# Patient Record
Sex: FEMALE | Race: WHITE
[De-identification: names, ages, dates, MRNs, and addresses within clinical notes are randomized per-mention and may not be internally consistent; named-entity substitution may affect disease eponyms.]

---

## 2017-06-26 ENCOUNTER — HOSPITAL ENCOUNTER (OUTPATIENT)
Dept: HOSPITAL 17 - CLAB | Age: 62
End: 2017-06-26
Attending: FAMILY MEDICINE
Payer: COMMERCIAL

## 2017-06-26 DIAGNOSIS — E78.5: Primary | ICD-10-CM

## 2017-06-26 DIAGNOSIS — R73.03: ICD-10-CM

## 2017-06-26 LAB
ALP SERPL-CCNC: 40 U/L (ref 45–117)
ALT SERPL-CCNC: 37 U/L (ref 10–53)
ANION GAP SERPL CALC-SCNC: 7 MEQ/L (ref 5–15)
AST SERPL-CCNC: 37 U/L (ref 15–37)
BASOPHILS # BLD AUTO: 0 TH/MM3 (ref 0–0.2)
BASOPHILS NFR BLD: 0.4 % (ref 0–2)
BILIRUB SERPL-MCNC: 0.4 MG/DL (ref 0.2–1)
BUN SERPL-MCNC: 28 MG/DL (ref 7–18)
CHLORIDE SERPL-SCNC: 102 MEQ/L (ref 98–107)
EOSINOPHIL # BLD: 0.2 TH/MM3 (ref 0–0.4)
EOSINOPHIL NFR BLD: 3.1 % (ref 0–4)
ERYTHROCYTE [DISTWIDTH] IN BLOOD BY AUTOMATED COUNT: 13.3 % (ref 11.6–17.2)
GFR SERPLBLD BASED ON 1.73 SQ M-ARVRAT: 58 ML/MIN (ref 89–?)
HCO3 BLD-SCNC: 28.1 MEQ/L (ref 21–32)
HCT VFR BLD CALC: 41.6 % (ref 35–46)
HDLC SERPL-MCNC: 70.2 MG/DL (ref 40–60)
HEMO FLAGS: (no result)
HEMOGLOBIN A1A: 1 %
HEMOGLOBIN A1B: 1.6 %
HEMOGLOBIN AO: 84.9 %
HEMOGLOBIN LA1C: 2.2 %
HEMOGLOBIN P3: 4 %
LDLC SERPL-MCNC: 111 MG/DL (ref 0–99)
LYMPHOCYTES # BLD AUTO: 1.1 TH/MM3 (ref 1–4.8)
LYMPHOCYTES NFR BLD AUTO: 23 % (ref 9–44)
MCH RBC QN AUTO: 31.3 PG (ref 27–34)
MCHC RBC AUTO-ENTMCNC: 34.1 % (ref 32–36)
MCV RBC AUTO: 91.5 FL (ref 80–100)
MONOCYTES NFR BLD: 10 % (ref 0–8)
NEUTROPHILS # BLD AUTO: 3 TH/MM3 (ref 1.8–7.7)
NEUTROPHILS NFR BLD AUTO: 63.5 % (ref 16–70)
PLATELET # BLD: 191 TH/MM3 (ref 150–450)
POTASSIUM SERPL-SCNC: 3.7 MEQ/L (ref 3.5–5.1)
RBC # BLD AUTO: 4.54 MIL/MM3 (ref 4–5.3)
SODIUM SERPL-SCNC: 137 MEQ/L (ref 136–145)
WBC # BLD AUTO: 4.8 TH/MM3 (ref 4–11)

## 2017-06-26 PROCEDURE — 36415 COLL VENOUS BLD VENIPUNCTURE: CPT

## 2017-06-26 PROCEDURE — 83036 HEMOGLOBIN GLYCOSYLATED A1C: CPT

## 2017-06-26 PROCEDURE — 80061 LIPID PANEL: CPT

## 2017-06-26 PROCEDURE — 84443 ASSAY THYROID STIM HORMONE: CPT

## 2017-06-26 PROCEDURE — 85025 COMPLETE CBC W/AUTO DIFF WBC: CPT

## 2017-06-26 PROCEDURE — 80053 COMPREHEN METABOLIC PANEL: CPT

## 2017-10-11 ENCOUNTER — HOSPITAL ENCOUNTER (OUTPATIENT)
Dept: HOSPITAL 17 - HSDC | Age: 62
End: 2017-10-11
Attending: COLON & RECTAL SURGERY
Payer: COMMERCIAL

## 2017-10-11 VITALS
DIASTOLIC BLOOD PRESSURE: 85 MMHG | TEMPERATURE: 97 F | OXYGEN SATURATION: 100 % | RESPIRATION RATE: 18 BRPM | SYSTOLIC BLOOD PRESSURE: 142 MMHG | HEART RATE: 60 BPM

## 2017-10-11 VITALS — HEIGHT: 65 IN | WEIGHT: 145.28 LBS | BODY MASS INDEX: 24.21 KG/M2

## 2017-10-11 DIAGNOSIS — Z01.810: ICD-10-CM

## 2017-10-11 DIAGNOSIS — Z12.11: Primary | ICD-10-CM

## 2017-10-11 PROCEDURE — 45378 DIAGNOSTIC COLONOSCOPY: CPT

## 2017-10-11 PROCEDURE — 00810: CPT

## 2017-10-11 PROCEDURE — 93005 ELECTROCARDIOGRAM TRACING: CPT

## 2017-10-11 NOTE — GIPROC
[facility]

[address] [facility]. [facility], [address]

 

 

COLONOSCOPY PROCEDURE REPORT     EXAM DATE: 10/11/2017

 

PATIENT NAME:      Joyec Mcneill           MR #:      [id number]

YOB: 1955      VISIT #:     [id number]

ENDOSCOPIST:     Tasha Oneal MD     ORDER #:     [id number]

ASSISTANT:      Diya Wong and Linda Zhu     STATUS:

outpatient

 

INDICATIONS:  The patient is a 62 yr old female here for a colonoscopy due to

Screening

PROCEDURE PERFORMED:     Total Colonoscopy

MEDICATIONS:     See Anesthesia Record

ESTIMATED BLOOD LOSS:     None

 

CONSENT: The patient understands the risks and benefits of the procedure and

understands that these risks include, but are not limited to: sedation,

allergic reaction, infection, perforation and/or bleeding. Alternative means of

evaluation and treatment include, among others: physical exam, x-rays, and/or

surgical intervention. The patient elects to proceed with this endoscopic

procedure.

 

DESCRIPTION OF PROCEDURE:



checked for proper function. Hand hygiene and appropriate measures for

infection prevention was taken. After the risks, benefits and alternatives of

the procedure were thoroughly explained, Informed consent was verified,

confirmed and timeout was successfully executed by the treatment team. A

digital exam was performed.  The     endoscope was introduced through the anus

and advanced to the cecum, which was identified by the appendiceal orifice,

tri-radiate valve, and ileocecal valve.   The prep quality was good      The

instrument was then slowly withdrawn as the colon was fully examined.

There were no mucosal abnormalities noted with the cecum, ascending colon,

transverse colon, descending colon, sigmoid, or rectum.  The scope was then

completely withdrawn from the patient and the procedure terminated.

 

 

 

ADVERSE EVENTS:      There were no complications.

WITHDRAWL TIME:

DEGREE OF DIFFICULTY:

IMPRESSIONS:     Normal Colon

 

RECOMMENDATIONS:     Followup in 10 years

PATIENT CONDITION:     Stable

DISPOSITION:     Home

RECALL:     10 years

 

_____________________________

Tasha Oneal MD

eSigned:  Tasha Oneal MD 10/11/2017 8:01 AM

 

 

cc:  Dr. Vickey Klein

 

 

 

 

PATIENT NAME:  Joyce Mcneill

MR#: [id number]

## 2017-10-11 NOTE — EKG
Date Performed: 10/11/2017       Time Performed: 06:26:18

 

PTAGE:      62 years

 

EKG:      Sinus rhythm 

 

 NORMAL ECG

 

PREVIOUS TRACING       : 05/03/2012 08.04 Compared to prior tracing no significant change

 

DOCTOR:   Mendez Wetzel  Interpretating Date/Time  10/11/2017 14:53:14

## 2017-10-16 ENCOUNTER — HOSPITAL ENCOUNTER (OUTPATIENT)
Dept: HOSPITAL 17 - CLAB | Age: 62
End: 2017-10-16
Attending: FAMILY MEDICINE
Payer: COMMERCIAL

## 2017-10-16 DIAGNOSIS — E78.5: Primary | ICD-10-CM

## 2017-10-16 LAB
ANION GAP SERPL CALC-SCNC: 8 MEQ/L (ref 5–15)
BUN SERPL-MCNC: 26 MG/DL (ref 7–18)
CHLORIDE SERPL-SCNC: 102 MEQ/L (ref 98–107)
GFR SERPLBLD BASED ON 1.73 SQ M-ARVRAT: 81 ML/MIN (ref 89–?)
HCO3 BLD-SCNC: 27 MEQ/L (ref 21–32)
POTASSIUM SERPL-SCNC: 3.9 MEQ/L (ref 3.5–5.1)
SODIUM SERPL-SCNC: 137 MEQ/L (ref 136–145)

## 2017-10-16 PROCEDURE — 82306 VITAMIN D 25 HYDROXY: CPT

## 2017-10-16 PROCEDURE — 36415 COLL VENOUS BLD VENIPUNCTURE: CPT

## 2017-10-16 PROCEDURE — 80048 BASIC METABOLIC PNL TOTAL CA: CPT

## 2018-01-30 ENCOUNTER — HOSPITAL ENCOUNTER (OUTPATIENT)
Dept: HOSPITAL 17 - CLAB | Age: 63
End: 2018-01-30
Attending: FAMILY MEDICINE
Payer: COMMERCIAL

## 2018-01-30 DIAGNOSIS — M54.9: Primary | ICD-10-CM

## 2018-01-30 LAB
ALBUMIN SERPL-MCNC: 4 GM/DL (ref 3.4–5)
ALP SERPL-CCNC: 43 U/L (ref 45–117)
ALT SERPL-CCNC: 39 U/L (ref 10–53)
AST SERPL-CCNC: 37 U/L (ref 15–37)
BASOPHILS # BLD AUTO: 0 TH/MM3 (ref 0–0.2)
BASOPHILS NFR BLD: 0.6 % (ref 0–2)
BILIRUB SERPL-MCNC: 0.5 MG/DL (ref 0.2–1)
BUN SERPL-MCNC: 18 MG/DL (ref 7–18)
CALCIUM SERPL-MCNC: 9.1 MG/DL (ref 8.5–10.1)
CHLORIDE SERPL-SCNC: 102 MEQ/L (ref 98–107)
CHOLEST SERPL-MCNC: 211 MG/DL (ref 120–200)
CHOLESTEROL/ HDL RATIO: 3.13 RATIO
COLOR UR: (no result)
CREAT SERPL-MCNC: 0.83 MG/DL (ref 0.5–1)
EOSINOPHIL # BLD: 0.1 TH/MM3 (ref 0–0.4)
EOSINOPHIL NFR BLD: 1.8 % (ref 0–4)
ERYTHROCYTE [DISTWIDTH] IN BLOOD BY AUTOMATED COUNT: 12.9 % (ref 11.6–17.2)
GFR SERPLBLD BASED ON 1.73 SQ M-ARVRAT: 70 ML/MIN (ref 89–?)
GLUCOSE UR STRIP-MCNC: (no result) MG/DL
HBA1C MFR BLD: 5.6 % (ref 4.3–6)
HCO3 BLD-SCNC: 30.3 MEQ/L (ref 21–32)
HCT VFR BLD CALC: 44.6 % (ref 35–46)
HDLC SERPL-MCNC: 67.2 MG/DL (ref 40–60)
HGB BLD-MCNC: 15.2 GM/DL (ref 11.6–15.3)
HGB UR QL STRIP: (no result)
KETONES UR STRIP-MCNC: (no result) MG/DL
LDLC SERPL-MCNC: 133 MG/DL (ref 0–99)
LYMPHOCYTES # BLD AUTO: 1.1 TH/MM3 (ref 1–4.8)
LYMPHOCYTES NFR BLD AUTO: 18.3 % (ref 9–44)
MCH RBC QN AUTO: 31.7 PG (ref 27–34)
MCHC RBC AUTO-ENTMCNC: 34.1 % (ref 32–36)
MCV RBC AUTO: 93 FL (ref 80–100)
MONOCYTE #: 0.5 TH/MM3 (ref 0–0.9)
MONOCYTES NFR BLD: 8.1 % (ref 0–8)
NEUTROPHILS # BLD AUTO: 4.4 TH/MM3 (ref 1.8–7.7)
NEUTROPHILS NFR BLD AUTO: 71.2 % (ref 16–70)
NITRITE UR QL STRIP: (no result)
PLATELET # BLD: 206 TH/MM3 (ref 150–450)
PMV BLD AUTO: 8.6 FL (ref 7–11)
PROT SERPL-MCNC: 7.8 GM/DL (ref 6.4–8.2)
RBC # BLD AUTO: 4.8 MIL/MM3 (ref 4–5.3)
SODIUM SERPL-SCNC: 137 MEQ/L (ref 136–145)
SP GR UR STRIP: 1 (ref 1–1.03)
SQUAMOUS #/AREA URNS HPF: <1 /HPF (ref 0–5)
TRANS CELLS #/AREA URNS HPF: <1 /HPF
TRIGL SERPL-MCNC: 53 MG/DL (ref 42–150)
URINE LEUKOCYTE ESTERASE: (no result)
WBC # BLD AUTO: 6.2 TH/MM3 (ref 4–11)

## 2018-01-30 PROCEDURE — 81001 URINALYSIS AUTO W/SCOPE: CPT

## 2018-01-30 PROCEDURE — 80061 LIPID PANEL: CPT

## 2018-01-30 PROCEDURE — 86316 IMMUNOASSAY TUMOR OTHER: CPT

## 2018-01-30 PROCEDURE — 85025 COMPLETE CBC W/AUTO DIFF WBC: CPT

## 2018-01-30 PROCEDURE — 84443 ASSAY THYROID STIM HORMONE: CPT

## 2018-01-30 PROCEDURE — 83036 HEMOGLOBIN GLYCOSYLATED A1C: CPT

## 2018-01-30 PROCEDURE — 36415 COLL VENOUS BLD VENIPUNCTURE: CPT

## 2018-01-30 PROCEDURE — 80053 COMPREHEN METABOLIC PANEL: CPT

## 2018-04-10 ENCOUNTER — HOSPITAL ENCOUNTER (OUTPATIENT)
Dept: HOSPITAL 17 - CLAB | Age: 63
End: 2018-04-10
Attending: FAMILY MEDICINE
Payer: COMMERCIAL

## 2018-04-10 DIAGNOSIS — R05: Primary | ICD-10-CM

## 2018-04-10 LAB
BASOPHILS # BLD AUTO: 0 TH/MM3 (ref 0–0.2)
BASOPHILS NFR BLD: 0.2 % (ref 0–2)
EOSINOPHIL # BLD: 0.1 TH/MM3 (ref 0–0.4)
EOSINOPHIL NFR BLD: 1.1 % (ref 0–4)
ERYTHROCYTE [DISTWIDTH] IN BLOOD BY AUTOMATED COUNT: 13.7 % (ref 11.6–17.2)
HCT VFR BLD CALC: 44 % (ref 35–46)
HGB BLD-MCNC: 15.1 GM/DL (ref 11.6–15.3)
LYMPHOCYTES # BLD AUTO: 1.5 TH/MM3 (ref 1–4.8)
LYMPHOCYTES NFR BLD AUTO: 14.8 % (ref 9–44)
MCH RBC QN AUTO: 31.5 PG (ref 27–34)
MCHC RBC AUTO-ENTMCNC: 34.2 % (ref 32–36)
MCV RBC AUTO: 92 FL (ref 80–100)
MONOCYTE #: 1 TH/MM3 (ref 0–0.9)
MONOCYTES NFR BLD: 9.2 % (ref 0–8)
NEUTROPHILS # BLD AUTO: 7.8 TH/MM3 (ref 1.8–7.7)
NEUTROPHILS NFR BLD AUTO: 74.7 % (ref 16–70)
PLATELET # BLD: 216 TH/MM3 (ref 150–450)
PMV BLD AUTO: 8.1 FL (ref 7–11)
RBC # BLD AUTO: 4.78 MIL/MM3 (ref 4–5.3)
WBC # BLD AUTO: 10.4 TH/MM3 (ref 4–11)

## 2018-04-10 PROCEDURE — 86480 TB TEST CELL IMMUN MEASURE: CPT

## 2018-04-10 PROCEDURE — 36415 COLL VENOUS BLD VENIPUNCTURE: CPT

## 2018-04-10 PROCEDURE — 85025 COMPLETE CBC W/AUTO DIFF WBC: CPT

## 2018-04-12 LAB
M TB TUBERC IFN-G BLD QL: 0.01 IU/ML
M TB TUBERC IFN-G BLD QL: 9.1 IU/ML
TB ANTIGEN MINUS NIL: 0.02 IU/ML